# Patient Record
Sex: FEMALE | Race: WHITE | NOT HISPANIC OR LATINO | Employment: FULL TIME | URBAN - METROPOLITAN AREA
[De-identification: names, ages, dates, MRNs, and addresses within clinical notes are randomized per-mention and may not be internally consistent; named-entity substitution may affect disease eponyms.]

---

## 2021-06-14 ENCOUNTER — OFFICE VISIT (OUTPATIENT)
Dept: URGENT CARE | Facility: CLINIC | Age: 51
End: 2021-06-14
Payer: COMMERCIAL

## 2021-06-14 VITALS
HEART RATE: 62 BPM | TEMPERATURE: 98 F | HEIGHT: 63 IN | SYSTOLIC BLOOD PRESSURE: 135 MMHG | RESPIRATION RATE: 18 BRPM | BODY MASS INDEX: 34.02 KG/M2 | WEIGHT: 192 LBS | DIASTOLIC BLOOD PRESSURE: 90 MMHG | OXYGEN SATURATION: 98 %

## 2021-06-14 DIAGNOSIS — H92.02 ACUTE OTALGIA, LEFT: Primary | ICD-10-CM

## 2021-06-14 PROBLEM — Z79.899 CONTROLLED SUBSTANCE AGREEMENT SIGNED: Status: ACTIVE | Noted: 2019-04-22

## 2021-06-14 PROBLEM — I10 BENIGN ESSENTIAL HTN: Status: ACTIVE | Noted: 2019-02-04

## 2021-06-14 PROBLEM — I10 HYPERTENSION: Status: ACTIVE | Noted: 2020-07-01

## 2021-06-14 PROBLEM — F90.9 ADHD: Status: ACTIVE | Noted: 2019-02-18

## 2021-06-14 PROBLEM — G47.00 INSOMNIA: Status: ACTIVE | Noted: 2019-02-18

## 2021-06-14 PROCEDURE — 99203 OFFICE O/P NEW LOW 30 MIN: CPT | Mod: S$GLB,,, | Performed by: NURSE PRACTITIONER

## 2021-06-14 PROCEDURE — 99203 PR OFFICE/OUTPT VISIT, NEW, LEVL III, 30-44 MIN: ICD-10-PCS | Mod: S$GLB,,, | Performed by: NURSE PRACTITIONER

## 2021-06-14 RX ORDER — LORATADINE 10 MG/1
10 TABLET ORAL DAILY
Qty: 14 TABLET | Refills: 0 | Status: SHIPPED | OUTPATIENT
Start: 2021-06-14 | End: 2023-12-22

## 2021-06-14 RX ORDER — LISINOPRIL AND HYDROCHLOROTHIAZIDE 20; 25 MG/1; MG/1
TABLET ORAL
COMMUNITY
Start: 2021-06-09

## 2021-06-14 RX ORDER — METHYLPREDNISOLONE 4 MG/1
TABLET ORAL
Qty: 21 TABLET | Refills: 0 | Status: SHIPPED | OUTPATIENT
Start: 2021-06-14

## 2021-06-14 RX ORDER — DEXTROAMPHETAMINE SACCHARATE, AMPHETAMINE ASPARTATE, DEXTROAMPHETAMINE SULFATE AND AMPHETAMINE SULFATE 7.5; 7.5; 7.5; 7.5 MG/1; MG/1; MG/1; MG/1
1 TABLET ORAL DAILY
COMMUNITY
Start: 2021-06-09

## 2021-06-14 RX ORDER — PSEUDOEPHEDRINE HCL 30 MG
30 TABLET ORAL EVERY 4 HOURS PRN
Qty: 24 TABLET | Refills: 0 | Status: SHIPPED | OUTPATIENT
Start: 2021-06-14 | End: 2021-06-24

## 2023-06-29 ENCOUNTER — OFFICE VISIT (OUTPATIENT)
Dept: URGENT CARE | Facility: CLINIC | Age: 53
End: 2023-06-29
Payer: COMMERCIAL

## 2023-06-29 VITALS
HEART RATE: 86 BPM | BODY MASS INDEX: 34.01 KG/M2 | DIASTOLIC BLOOD PRESSURE: 105 MMHG | SYSTOLIC BLOOD PRESSURE: 141 MMHG | OXYGEN SATURATION: 98 % | TEMPERATURE: 98 F | WEIGHT: 192 LBS | RESPIRATION RATE: 16 BRPM

## 2023-06-29 DIAGNOSIS — T63.481A INSECT STINGS, ACCIDENTAL OR UNINTENTIONAL, INITIAL ENCOUNTER: Primary | ICD-10-CM

## 2023-06-29 PROCEDURE — 99203 PR OFFICE/OUTPT VISIT, NEW, LEVL III, 30-44 MIN: ICD-10-PCS | Mod: 25,S$GLB,, | Performed by: STUDENT IN AN ORGANIZED HEALTH CARE EDUCATION/TRAINING PROGRAM

## 2023-06-29 PROCEDURE — 96372 THER/PROPH/DIAG INJ SC/IM: CPT | Mod: S$GLB,,, | Performed by: STUDENT IN AN ORGANIZED HEALTH CARE EDUCATION/TRAINING PROGRAM

## 2023-06-29 PROCEDURE — 96372 PR INJECTION,THERAP/PROPH/DIAG2ST, IM OR SUBCUT: ICD-10-PCS | Mod: S$GLB,,, | Performed by: STUDENT IN AN ORGANIZED HEALTH CARE EDUCATION/TRAINING PROGRAM

## 2023-06-29 PROCEDURE — 99203 OFFICE O/P NEW LOW 30 MIN: CPT | Mod: 25,S$GLB,, | Performed by: STUDENT IN AN ORGANIZED HEALTH CARE EDUCATION/TRAINING PROGRAM

## 2023-06-29 RX ORDER — DEXAMETHASONE SODIUM PHOSPHATE 4 MG/ML
8 INJECTION, SOLUTION INTRA-ARTICULAR; INTRALESIONAL; INTRAMUSCULAR; INTRAVENOUS; SOFT TISSUE
Status: COMPLETED | OUTPATIENT
Start: 2023-06-29 | End: 2023-06-29

## 2023-06-29 RX ADMIN — DEXAMETHASONE SODIUM PHOSPHATE 8 MG: 4 INJECTION, SOLUTION INTRA-ARTICULAR; INTRALESIONAL; INTRAMUSCULAR; INTRAVENOUS; SOFT TISSUE at 01:06

## 2023-08-29 ENCOUNTER — TELEPHONE (OUTPATIENT)
Dept: ORTHOPEDICS | Facility: CLINIC | Age: 53
End: 2023-08-29

## 2023-08-29 NOTE — TELEPHONE ENCOUNTER
----- Message from Pam Quintana MA sent at 8/28/2023  3:28 PM CDT -----    ----- Message -----  From: Stella Shaffer  Sent: 8/28/2023   1:41 PM CDT  To: [facility] Staff    REFERRAL

## 2023-12-04 ENCOUNTER — OFFICE VISIT (OUTPATIENT)
Dept: URGENT CARE | Facility: CLINIC | Age: 53
End: 2023-12-04
Payer: COMMERCIAL

## 2023-12-04 VITALS
DIASTOLIC BLOOD PRESSURE: 86 MMHG | WEIGHT: 200 LBS | HEIGHT: 63 IN | SYSTOLIC BLOOD PRESSURE: 126 MMHG | BODY MASS INDEX: 35.44 KG/M2 | RESPIRATION RATE: 16 BRPM | TEMPERATURE: 99 F | HEART RATE: 92 BPM | OXYGEN SATURATION: 98 %

## 2023-12-04 DIAGNOSIS — J10.1 INFLUENZA B: Primary | ICD-10-CM

## 2023-12-04 DIAGNOSIS — J02.9 SORE THROAT: ICD-10-CM

## 2023-12-04 LAB
CTP QC/QA: YES
CTP QC/QA: YES
FLUAV AG NPH QL: NEGATIVE
FLUBV AG NPH QL: POSITIVE
S PYO RRNA THROAT QL PROBE: NEGATIVE

## 2023-12-04 PROCEDURE — 99214 PR OFFICE/OUTPT VISIT, EST, LEVL IV, 30-39 MIN: ICD-10-PCS | Mod: S$GLB,,, | Performed by: PHYSICIAN ASSISTANT

## 2023-12-04 PROCEDURE — 99214 OFFICE O/P EST MOD 30 MIN: CPT | Mod: S$GLB,,, | Performed by: PHYSICIAN ASSISTANT

## 2023-12-04 PROCEDURE — 87880 POCT RAPID STREP A: ICD-10-PCS | Mod: QW,,, | Performed by: PHYSICIAN ASSISTANT

## 2023-12-04 PROCEDURE — 87804 POCT INFLUENZA A/B: ICD-10-PCS | Mod: 59,QW,, | Performed by: PHYSICIAN ASSISTANT

## 2023-12-04 PROCEDURE — 87880 STREP A ASSAY W/OPTIC: CPT | Mod: QW,,, | Performed by: PHYSICIAN ASSISTANT

## 2023-12-04 PROCEDURE — 87804 INFLUENZA ASSAY W/OPTIC: CPT | Mod: 59,QW,, | Performed by: PHYSICIAN ASSISTANT

## 2023-12-04 RX ORDER — ALLOPURINOL 100 MG/1
TABLET ORAL
COMMUNITY
Start: 2023-11-10

## 2023-12-04 RX ORDER — OSELTAMIVIR PHOSPHATE 75 MG/1
75 CAPSULE ORAL 2 TIMES DAILY
Qty: 10 CAPSULE | Refills: 0 | Status: SHIPPED | OUTPATIENT
Start: 2023-12-04 | End: 2023-12-09

## 2023-12-04 NOTE — PATIENT INSTRUCTIONS
Take Tylenol or Motrin as needed.  You can take over-the-counter cough medications.  Tamiflu is not required to treat influenza and is recommended for those with chronic medical conditions or those under two.  I have sent it to the pharmacy for you in case she wants to take it.  Follow up with your primary care provider.  Return for any new or worsening symptoms.

## 2023-12-04 NOTE — PROGRESS NOTES
"Subjective:      Patient ID: María Wilson is a 53 y.o. female.    Vitals:  height is 5' 3" (1.6 m) and weight is 90.7 kg (200 lb). Her oral temperature is 98.8 °F (37.1 °C). Her blood pressure is 126/86 and her pulse is 92. Her respiration is 16 and oxygen saturation is 98%.     Chief Complaint: Sore Throat    Sore Throat   This is a new problem. The current episode started yesterday. The problem has been unchanged. There has been no fever. The pain is at a severity of 8/10. The pain is mild. Associated symptoms include congestion and coughing. She has had exposure to strep. She has tried nothing for the symptoms. The treatment provided no relief.     HENT:  Positive for congestion and sore throat.    Respiratory:  Positive for cough.     Objective:     Physical Exam    Assessment:     No diagnosis found.    Plan:       There are no diagnoses linked to this encounter.                "

## 2023-12-04 NOTE — PROGRESS NOTES
"Subjective:      Patient ID: María Wilson is a 53 y.o. female.    Vitals:  height is 5' 3" (1.6 m) and weight is 90.7 kg (200 lb). Her oral temperature is 98.8 °F (37.1 °C). Her blood pressure is 126/86 and her pulse is 92. Her respiration is 16 and oxygen saturation is 98%.     Chief Complaint: Sore Throat    Patient is a 53-year-old female who presents with sore throat.  She reports symptoms started yesterday.  She has associated cough and congestion.  She reports recent exposure to strep.  She is not taken any medications for her symptoms.  She denies fever.  She denies nausea, vomiting or diarrhea.              Constitution: Negative for chills and fever.   HENT:  Positive for congestion and sore throat. Negative for ear pain and ear discharge.    Respiratory:  Positive for cough.    Gastrointestinal:  Negative for abdominal pain, nausea, vomiting and diarrhea.      Objective:     Physical Exam   Constitutional: She appears well-developed.   HENT:   Head: Normocephalic and atraumatic.   Ears:   Right Ear: Tympanic membrane, external ear and ear canal normal.   Left Ear: Tympanic membrane, external ear and ear canal normal.   Nose: Nose normal.   Mouth/Throat: Uvula is midline and oropharynx is clear and moist. Mucous membranes are moist. No oropharyngeal exudate or posterior oropharyngeal erythema. Oropharynx is clear.   Eyes: Conjunctivae are normal.   Cardiovascular: Normal rate and normal heart sounds.   Pulmonary/Chest: Effort normal and breath sounds normal. No stridor. No respiratory distress. She has no decreased breath sounds.   Abdominal: Normal appearance.   Neurological: She is alert.   Nursing note and vitals reviewed.      Assessment:     1. Influenza B    2. Sore throat        Plan:       Influenza B  -     oseltamivir (TAMIFLU) 75 MG capsule; Take 1 capsule (75 mg total) by mouth 2 (two) times daily. for 5 days  Dispense: 10 capsule; Refill: 0    Sore throat  -     POCT Influenza A/B Rapid " Antigen  -     POCT rapid strep A          Medical Decision Making:   History:   Old Medical Records: I decided to obtain old medical records.  Differential Diagnosis:   Influenza   Strep   Viral URI  Urgent Care Management:  Urgent evaluation of a well appearing 53-year-old female who presents with sore throat, cough and congestion that started yesterday.  Vital signs are stable. Clear and equal breath sounds bilaterally. Oxygen saturation is stable. I doubt pneumonia. No sign of otitis media. Denied GI complaints.  No posterior oropharyngeal erythema.  Midline uvula.  Patient is noted to be influenza B positive.  Discussed risks and benefits of Tamiflu, patient would like the medication prescribed. Discussed results with patient. Return precautions given. Based on my clinical evaluation, I do not appreciate any immediate, emergent, or life threatening condition or etiology that warrants additional workup today and feel that the patient can be discharged with close follow up care.  Patient is to follow up with their primary care provider. All questions answered.

## 2023-12-22 ENCOUNTER — OFFICE VISIT (OUTPATIENT)
Dept: URGENT CARE | Facility: CLINIC | Age: 53
End: 2023-12-22
Payer: COMMERCIAL

## 2023-12-22 VITALS
WEIGHT: 200 LBS | SYSTOLIC BLOOD PRESSURE: 152 MMHG | RESPIRATION RATE: 18 BRPM | TEMPERATURE: 98 F | HEART RATE: 88 BPM | BODY MASS INDEX: 35.44 KG/M2 | HEIGHT: 63 IN | DIASTOLIC BLOOD PRESSURE: 73 MMHG | OXYGEN SATURATION: 99 %

## 2023-12-22 DIAGNOSIS — R09.81 CONGESTION OF NASAL SINUS: ICD-10-CM

## 2023-12-22 DIAGNOSIS — R05.1 ACUTE COUGH: ICD-10-CM

## 2023-12-22 DIAGNOSIS — J02.9 SORE THROAT: Primary | ICD-10-CM

## 2023-12-22 LAB
CTP QC/QA: YES
S PYO RRNA THROAT QL PROBE: NEGATIVE

## 2023-12-22 PROCEDURE — 87880 POCT RAPID STREP A: ICD-10-PCS | Mod: QW,,, | Performed by: NURSE PRACTITIONER

## 2023-12-22 PROCEDURE — 99213 OFFICE O/P EST LOW 20 MIN: CPT | Mod: S$GLB,,, | Performed by: NURSE PRACTITIONER

## 2023-12-22 PROCEDURE — 87880 STREP A ASSAY W/OPTIC: CPT | Mod: QW,,, | Performed by: NURSE PRACTITIONER

## 2023-12-22 PROCEDURE — 99213 PR OFFICE/OUTPT VISIT, EST, LEVL III, 20-29 MIN: ICD-10-PCS | Mod: S$GLB,,, | Performed by: NURSE PRACTITIONER

## 2023-12-22 RX ORDER — VALSARTAN AND HYDROCHLOROTHIAZIDE 320; 25 MG/1; MG/1
1 TABLET, FILM COATED ORAL
COMMUNITY
Start: 2023-12-21

## 2023-12-22 NOTE — PROGRESS NOTES
"Subjective:      Patient ID: María Wilson is a 53 y.o. female.    Vitals:  height is 5' 3" (1.6 m) and weight is 90.7 kg (200 lb). Her temperature is 98.2 °F (36.8 °C). Her blood pressure is 152/73 (abnormal). Her respiration is 18 and oxygen saturation is 99%.     Chief Complaint: Sore Throat    Sore Throat   This is a new problem. The current episode started yesterday. The problem has been gradually worsening. There has been no fever. Associated symptoms include congestion, coughing, swollen glands and trouble swallowing. Treatments tried: neftaly corcoran. The treatment provided mild relief.       HENT:  Positive for congestion, sore throat and trouble swallowing.    Respiratory:  Positive for cough.       Objective:     Physical Exam    Assessment:     1. Sore throat        Plan:       Sore throat  -     POCT rapid strep A                    "

## 2024-07-30 ENCOUNTER — OFFICE VISIT (OUTPATIENT)
Dept: URGENT CARE | Facility: CLINIC | Age: 54
End: 2024-07-30
Payer: COMMERCIAL

## 2024-07-30 VITALS
OXYGEN SATURATION: 98 % | TEMPERATURE: 98 F | SYSTOLIC BLOOD PRESSURE: 128 MMHG | WEIGHT: 203 LBS | HEART RATE: 67 BPM | DIASTOLIC BLOOD PRESSURE: 87 MMHG | RESPIRATION RATE: 16 BRPM | BODY MASS INDEX: 35.96 KG/M2

## 2024-07-30 DIAGNOSIS — M72.2 PLANTAR FASCIITIS OF RIGHT FOOT: Primary | ICD-10-CM

## 2024-07-30 PROCEDURE — 96372 THER/PROPH/DIAG INJ SC/IM: CPT | Mod: S$GLB,,, | Performed by: NURSE PRACTITIONER

## 2024-07-30 PROCEDURE — 99214 OFFICE O/P EST MOD 30 MIN: CPT | Mod: 25,S$GLB,, | Performed by: NURSE PRACTITIONER

## 2024-07-30 RX ORDER — METHYLPREDNISOLONE 4 MG/1
TABLET ORAL
Qty: 21 EACH | Refills: 0 | Status: SHIPPED | OUTPATIENT
Start: 2024-08-01 | End: 2024-08-22

## 2024-07-30 RX ORDER — DEXAMETHASONE SODIUM PHOSPHATE 4 MG/ML
8 INJECTION, SOLUTION INTRA-ARTICULAR; INTRALESIONAL; INTRAMUSCULAR; INTRAVENOUS; SOFT TISSUE
Status: COMPLETED | OUTPATIENT
Start: 2024-07-30 | End: 2024-07-30

## 2024-07-30 RX ADMIN — DEXAMETHASONE SODIUM PHOSPHATE 8 MG: 4 INJECTION, SOLUTION INTRA-ARTICULAR; INTRALESIONAL; INTRAMUSCULAR; INTRAVENOUS; SOFT TISSUE at 02:07

## 2024-07-30 NOTE — PROGRESS NOTES
Subjective:      Patient ID: María Wilson is a 54 y.o. female.    Vitals:  weight is 92.1 kg (203 lb). Her temperature is 98.2 °F (36.8 °C). Her blood pressure is 128/87 and her pulse is 67. Her respiration is 16 and oxygen saturation is 98%.     Chief Complaint: Ankle Pain    Patient presents to clinic with complaints of right heel pain.  Denies specific injury to area.  Reports walks often during job.    Ankle Pain   The incident occurred 6 to 12 hours ago. The injury mechanism is unknown. The pain is present in the right ankle. The pain is at a severity of 10/10. Pertinent negatives include no numbness.       Constitution: Negative for activity change, appetite change, chills, fatigue, fever, unexpected weight change and generalized weakness.   HENT:  Negative for ear pain, ear discharge, foreign body in ear, tinnitus, hearing loss, dental problem, mouth sores, tongue pain, facial swelling, congestion, postnasal drip, sinus pain, sinus pressure, sore throat, trouble swallowing and voice change.    Neck: Negative for neck pain, neck stiffness and painful lymph nodes.   Cardiovascular:  Negative for chest pain, leg swelling, palpitations and sob on exertion.   Eyes:  Negative for eye trauma, eye discharge, eye itching, eye pain, eye redness, vision loss and eyelid swelling.   Respiratory:  Negative for chest tightness, cough, sputum production, COPD, shortness of breath, wheezing and asthma.    Gastrointestinal:  Negative for abdominal pain, nausea, vomiting, constipation, diarrhea, bright red blood in stool and dark colored stools.   Endocrine: hair loss, cold intolerance and heat intolerance.   Genitourinary:  Negative for dysuria, frequency, urgency and hematuria.   Musculoskeletal:  Positive for pain and joint pain. Negative for trauma, joint swelling, abnormal ROM of joint and muscle ache.   Skin:  Negative for color change, pale, rash, wound and hives.   Allergic/Immunologic: Negative for environmental  allergies, seasonal allergies, food allergies, asthma, hives and itching.   Neurological:  Negative for dizziness, history of vertigo, light-headedness, facial drooping, speech difficulty, headaches, disorientation, altered mental status, loss of consciousness and numbness.   Hematologic/Lymphatic: Negative for swollen lymph nodes and easy bruising/bleeding. Does not bruise/bleed easily.   Psychiatric/Behavioral:  Negative for altered mental status, disorientation, confusion, agitation, sleep disturbance and hallucinations.       Objective:     Physical Exam   Constitutional: She is oriented to person, place, and time. She appears well-developed. She is cooperative.   HENT:   Head: Normocephalic and atraumatic.   Ears:   Right Ear: Hearing, tympanic membrane, external ear and ear canal normal.   Left Ear: Hearing, tympanic membrane, external ear and ear canal normal.   Nose: Nose normal. No mucosal edema or nasal deformity. No epistaxis. Right sinus exhibits no maxillary sinus tenderness and no frontal sinus tenderness. Left sinus exhibits no maxillary sinus tenderness and no frontal sinus tenderness.   Mouth/Throat: Uvula is midline, oropharynx is clear and moist and mucous membranes are normal. No trismus in the jaw. Normal dentition. No uvula swelling.   Eyes: Conjunctivae and lids are normal.   Neck: Trachea normal and phonation normal. Neck supple.   Cardiovascular: Normal rate, regular rhythm, normal heart sounds and normal pulses.   Pulmonary/Chest: Effort normal and breath sounds normal.   Abdominal: Normal appearance and bowel sounds are normal. Soft.   Musculoskeletal: Normal range of motion.         General: Normal range of motion.      Right foot: Tenderness present.      Comments: Tenderness to plantar surface of heel and medial right ankle   Neurological: She is alert and oriented to person, place, and time. She exhibits normal muscle tone.   Skin: Skin is warm, dry and intact.   Psychiatric: Her  speech is normal and behavior is normal. Judgment and thought content normal.   Nursing note and vitals reviewed.      Assessment:     1. Plantar fasciitis of right foot        Plan:       Plantar fasciitis of right foot  -     dexAMETHasone injection 8 mg administered intramuscularly in clinic  -     methylPREDNISolone (MEDROL DOSEPACK) 4 mg tablet; use as directed  Dispense: 21 each; Refill: 0    Patient was instructed to restrict activity, with goal to reduce swelling and pain. Mild injury to be treated at home with anti-inflammatory and/or pain medication and PRICE (Protect from further injury, Rest, Ice or heat for 15-20 min every 60-90 min, Compression with elastic bandage if appropriate, Elevation to prevent swelling.)  After swelling and pain are reduced, patient may begin rehabilitation exercises to prevent stiffness, improve ROM, and restore tissue/joint flexibility and strength.  Patient was encouraged to follow up if pain persists or if it is difficult to put weight on the injury after 2 weeks.    Return to clinic for new or worsening symptoms.  Patient is recommended to follow-up with their PCP post discharge.    Total time spent on med rec, H&P, with over half of the time in direct patient care: 31 minutes         Additional MDM:     Heart Failure Score:   COPD = No

## 2024-07-30 NOTE — PATIENT INSTRUCTIONS
Patient was instructed to restrict activity, with goal to reduce swelling and pain. Mild injury to be treated at home with anti-inflammatory and/or pain medication and PRICE (Protect from further injury, Rest, Ice or heat for 15-20 min every 60-90 min, Compression with elastic bandage if appropriate, Elevation to prevent swelling.)  After swelling and pain are reduced, patient may begin rehabilitation exercises to prevent stiffness, improve ROM, and restore tissue/joint flexibility and strength.  Patient was encouraged to follow up if pain persists or if it is difficult to put weight on the injury after 2 weeks.    Thank you for the opportunity to care for you today.  Please take all medications as directed, and continue any previously prescribed medications unless we specifically discussed discontinuing them.  If your symptoms do not resolve or worsen please return to the clinic for re-evaluation.  If your situation becomes emergent, please present to the nearest emergency department.  Follow-up with your PCP for continued evaluation and management.

## 2025-03-10 DIAGNOSIS — R92.333 HETEROGENEOUSLY DENSE TISSUE OF BOTH BREASTS ON MAMMOGRAPHY: Primary | ICD-10-CM

## 2025-03-10 DIAGNOSIS — Z91.89 INCREASED RISK OF BREAST CANCER: ICD-10-CM

## 2025-05-12 DIAGNOSIS — Z91.89 AT HIGH RISK FOR BREAST CANCER: Primary | ICD-10-CM

## 2025-05-13 ENCOUNTER — TELEPHONE (OUTPATIENT)
Facility: CLINIC | Age: 55
End: 2025-05-13
Payer: COMMERCIAL

## 2025-05-13 NOTE — TELEPHONE ENCOUNTER
Spoke with pt. She currently has [employer] insurance. We are OON. Spoke with yesy rodríguez np. She will send referral to [facility].

## 2025-07-10 DIAGNOSIS — R59.9 ENLARGED LYMPH NODE: Primary | ICD-10-CM

## 2025-07-15 ENCOUNTER — TELEPHONE (OUTPATIENT)
Dept: FAMILY MEDICINE | Facility: CLINIC | Age: 55
End: 2025-07-15
Payer: COMMERCIAL

## 2025-07-15 ENCOUNTER — OFFICE VISIT (OUTPATIENT)
Dept: OTOLARYNGOLOGY | Facility: CLINIC | Age: 55
End: 2025-07-15
Payer: COMMERCIAL

## 2025-07-15 VITALS — WEIGHT: 191 LBS | BODY MASS INDEX: 33.84 KG/M2 | HEIGHT: 63 IN

## 2025-07-15 DIAGNOSIS — R59.9 ENLARGED LYMPH NODE: ICD-10-CM

## 2025-07-15 PROCEDURE — 99999 PR PBB SHADOW E&M-EST. PATIENT-LVL III: CPT | Mod: PBBFAC,,, | Performed by: OTOLARYNGOLOGY

## 2025-07-15 NOTE — PROGRESS NOTES
"Subjective:       Patient ID: María Wilson is a 55 y.o. female.    Chief Complaint: Mass (Patient here to have her lymph nodes evaluated. States, " they swell quite often and I just keep getting told lets check them, check what?" )      This typically healthy 55-year-old tells me that for about a year she has been having multiple lymph nodes throughout her body she references her axilla her groin and her neck.  She has seen one of the ENT doctors at [facility] who evaluated this with a CT done in December of 2024 and then a follow up CT done just three weeks ago.  The CT shows multiple lymph nodes adjacent to the submandibular gland and while I can not see the films themselves the report is pasted below for convenience.      She tells me they are nontender but she was not particularly satisfied with the option of simply observing multiple lymph nodes and she references her aunt who recently had lymphoma.  This understandably brings her concern level up a notch.                [facility]    06/25/2025 9:22 AM CDT   CT neck with contrast    HISTORY: Thyroid nodule, and palpable area in the right submandibular region.    TECHNIQUE: Axial images obtained through the neck. 110 cc [id number]-[id number] IV. Reconstructed images displayed as well.    FINDINGS: Comparison made to CT neck 12/13/2024. Skull base demonstrates no evidence of acute process. The nasopharynx is unremarkable. The oropharynx demonstrates no focal evidence of abnormality.    Deviation of the nasal septum to the left noted.    Salivary glands demonstrate no evidence of acute process.    In the area of patient's palpable abnormality, there are submandibular lymph nodes noted. The larger of these estimated at 1.3 x 0.9 cm in diameter on the right, and 1.1 x 0.7 cm in diameter on the left. No evidence of mucosal mass demonstrated subtle  asymmetry in the region of the lingual tonsil anterior to the epiglottis does not appear " changed when compared to previous study. Minimal thyroid nodule on the right measuring 4 mm. A minimal 4 mm left lobe thyroid nodule better seen on this exam is  stable as well. The upper portion of the mediastinum demonstrates no evidence of acute process.    The upper lungs are unremarkable. Multilevel degenerative change cervical spine including prominent anterior osteophyte formation at the C6-C7 level.    IMPRESSION:  1. Submandibular lymph nodes corresponding to the patient's palpable abnormality, similar to slightly decreased in size when compared to previous exam of 12/13/2024.  2. No other evidence of interval change.    Finalized by Rob Cunningham MD 6/25/2025 9:22 AM           Objective:      ENT Physical Exam    So palpating her neck she has multiple lymph nodes palpable that do seem separate from the submandibular gland the node that is most clearly mobile an and a tethered an unrelated to the submandibular gland is in the left neck adjacent to the submandibular gland.  It may not be the largest but that is the easiest to localize and in that regard I have discussed with her one option which includes a needle biopsy of this node, choosing this node because that is easiest to isolate.      She is hoping we can proceed with that to get more clarity of possible and I have made it clear to her that a needle biopsy of the lymph node does not always provide a specific diagnosis but it isn't a next step to further identify what is going on.  I have also made it clear to her that that is often the case that a needle biopsy of the type we will begin with we will benefit from a 2nd needle biopsy sent for flow cytometry but that is best to do him in steps and see what the general cytology reveals 1st.    Procedure:  After discussing with the patient as above the left side of the neck was prepped out with alcohol sprayed with ethyl chloride.  A 22 gauge needle was used to make multiple passes through a small but  easily palpable lymph node in the left submandibular region.  The resulting needle biopsy was plated on glass slides and sprayed with fixative.  After plating the left node passes on two slides    I then access one of the right-sided nodes a little more anterior in the submandibular triangle and made multiple passes through that particular node and plated it on one slide.        Assessment:       1. Enlarged lymph node         Plan:          So I have sent fine-needle aspiration samples for cytology for one of the left lymph nodes was relatively easy to isolate from the submandibular gland and then also from one on the right.  We will discuss the findings when they are available and I mentioned to her that in some cases that is appropriate to repeat this for flow cytometry depending on how much clarity the pathologist is able to obtain from the needle biopsy samples that I have sent

## 2025-07-15 NOTE — TELEPHONE ENCOUNTER
Copied from CRM #[id number]. Topic: General Inquiry - Patient Advice  >> Jul 15, 2025  1:54 PM Patria wrote:  Type:  Sooner Apoointment Request    Caller is requesting a sooner appointment.  Caller declined first available appointment listed below.  Caller will not accept being placed on the waitlist and is requesting a message be sent to doctor.  Name of Caller:pt   When is the first available appointment?7/30/2025  Symptoms: n/a  Would the patient rather a call back or a response via [facility]? Call back   Best Call Back Number:[phone]    Additional Information:  Pt needs to reschedule appt please call back

## 2025-08-01 ENCOUNTER — OFFICE VISIT (OUTPATIENT)
Dept: FAMILY MEDICINE | Facility: CLINIC | Age: 55
End: 2025-08-01
Payer: COMMERCIAL

## 2025-08-01 VITALS
OXYGEN SATURATION: 98 % | BODY MASS INDEX: 33.82 KG/M2 | SYSTOLIC BLOOD PRESSURE: 124 MMHG | HEIGHT: 63 IN | RESPIRATION RATE: 18 BRPM | WEIGHT: 190.88 LBS | DIASTOLIC BLOOD PRESSURE: 88 MMHG | HEART RATE: 83 BPM

## 2025-08-01 DIAGNOSIS — M25.562 BILATERAL CHRONIC KNEE PAIN: ICD-10-CM

## 2025-08-01 DIAGNOSIS — F90.2 ATTENTION DEFICIT HYPERACTIVITY DISORDER (ADHD), COMBINED TYPE: ICD-10-CM

## 2025-08-01 DIAGNOSIS — G25.81 RESTLESS LEG: ICD-10-CM

## 2025-08-01 DIAGNOSIS — M10.9 GOUT, UNSPECIFIED CAUSE, UNSPECIFIED CHRONICITY, UNSPECIFIED SITE: ICD-10-CM

## 2025-08-01 DIAGNOSIS — G89.29 BILATERAL CHRONIC KNEE PAIN: ICD-10-CM

## 2025-08-01 DIAGNOSIS — M25.561 BILATERAL CHRONIC KNEE PAIN: ICD-10-CM

## 2025-08-01 DIAGNOSIS — I10 BENIGN ESSENTIAL HTN: Primary | ICD-10-CM

## 2025-08-01 DIAGNOSIS — M25.512 ACUTE PAIN OF LEFT SHOULDER: ICD-10-CM

## 2025-08-01 DIAGNOSIS — H40.89 OTHER SPECIFIED GLAUCOMA, UNSPECIFIED LATERALITY: ICD-10-CM

## 2025-08-01 LAB
AMP AMPHETAMINE 1000 NM/ML POC: NEGATIVE
BAR BARBITURATES 300 NG/ML POC: NEGATIVE
BUP BUPRENORPHINE 10 NG/ML POC: NEGATIVE
BZO BENZODIAZEPINES 300 NG/ML POC: NEGATIVE
COC COCAINE 300 NG/ML POC: NEGATIVE
CREATININE (CR) POC: 20
CTP QC/QA: YES
MET METHAMPHETAMINE 1000 NG/ML POC: NEGATIVE
MOP/OPI300 MORPHINE 300 NG/ML POC: NEGATIVE
MTD METHADONE 300 NG/ML POC: NEGATIVE
OXIDANT (OX) POC: NEGATIVE
OXY OXYCODONE 100 NG/ML POC: NEGATIVE
SPECIFIC GRAVITY (SG) POC: 1.01
TEMPERATURE (°F) POC: 92
THC MARIJUANA 50 NG/ML POC: ABNORMAL

## 2025-08-01 PROCEDURE — 3074F SYST BP LT 130 MM HG: CPT | Mod: CPTII,S$GLB,, | Performed by: FAMILY MEDICINE

## 2025-08-01 PROCEDURE — 3008F BODY MASS INDEX DOCD: CPT | Mod: CPTII,S$GLB,, | Performed by: FAMILY MEDICINE

## 2025-08-01 PROCEDURE — 1159F MED LIST DOCD IN RCRD: CPT | Mod: CPTII,S$GLB,, | Performed by: FAMILY MEDICINE

## 2025-08-01 PROCEDURE — 99999 PR PBB SHADOW E&M-EST. PATIENT-LVL IV: CPT | Mod: PBBFAC,,, | Performed by: FAMILY MEDICINE

## 2025-08-01 PROCEDURE — 80305 DRUG TEST PRSMV DIR OPT OBS: CPT | Mod: QW,S$GLB,, | Performed by: FAMILY MEDICINE

## 2025-08-01 PROCEDURE — 3079F DIAST BP 80-89 MM HG: CPT | Mod: CPTII,S$GLB,, | Performed by: FAMILY MEDICINE

## 2025-08-01 PROCEDURE — 1160F RVW MEDS BY RX/DR IN RCRD: CPT | Mod: CPTII,S$GLB,, | Performed by: FAMILY MEDICINE

## 2025-08-01 PROCEDURE — 99214 OFFICE O/P EST MOD 30 MIN: CPT | Mod: S$GLB,,, | Performed by: FAMILY MEDICINE

## 2025-08-01 RX ORDER — DEXTROAMPHETAMINE SACCHARATE, AMPHETAMINE ASPARTATE, DEXTROAMPHETAMINE SULFATE AND AMPHETAMINE SULFATE 7.5; 7.5; 7.5; 7.5 MG/1; MG/1; MG/1; MG/1
1 TABLET ORAL DAILY
Qty: 30 TABLET | Refills: 0 | Status: SHIPPED | OUTPATIENT
Start: 2025-08-01

## 2025-08-01 NOTE — PROGRESS NOTES
"  Subjective:       Patient ID: María Wilson is a 55 y.o. female.    Chief Complaint: Establish Care    History of Present Illness    CHIEF COMPLAINT:  Ms. Wilson presents today for follow up and medication management.    ADHD:  She continues Adderall 30 mg tablets, taking half dose daily during weekdays to maintain focus and complete work projects. She intentionally does not take medication on weekends, stating it is less critical to focus during non-work days. When medication is not taken, work performance is noticeably impaired with decreased productivity and ability to finish tasks. She denies side effects or concerns with current medication regimen.    MEDICAL MARIJUANA USE:  She uses medical marijuana for management of glaucoma and restless leg syndrome, primarily on weekends while avoiding use during work hours. Medical marijuana helps alleviate restless leg symptoms and improves her ability to sleep. She confirms regular follow-up with an eye doctor for glaucoma management.    SHOULDER/ARM PAIN:  She reports burning, stabbing pain localized near the shoulder blade, present for approximately 2 months. She describes the area as feeling constantly bruised when touched with associated numbness radiating down the entire arm during pain episodes. She denies any known injury or specific repetitive motion causing symptoms. The pain is characterized as a non-deep burning stab that is intermittent with no symptoms on the contralateral side. She notes the sensation is so intense that it feels like she can "almost feel her hair growing" in the affected area.    KNEE PROBLEMS:  She reports chronic bilateral knee pain, predominantly affecting the left knee with functional limitations where prolonged sitting impairs her mobility. Previous MRIs showed minimal findings. She has received cortisone injections which provided symptomatic relief for approximately 5 weeks. She currently manages knee discomfort by staying active " "and avoiding prolonged sitting. She is currently wearing insoles and is interested in obtaining additional orthopedic evaluation. She denies acute injury and is motivated to explore further treatment options to improve knee mobility and reduce discomfort.    NIGHT SWEATS:  She reports severe night sweats that are significantly disrupting her sleep. She describes waking up at 3:00 AM and needing to take a shower due to feeling like she was "swimming and sweating". She indicates that a previous healthcare provider declined to treat her night sweats due to concerns about cancer risk, specifically related to her history of breast cancer. She is seeking alternative management options for these symptoms.    DENTAL ISSUES:  She reports recent dental evaluation with concern for potential oral cancer. Dental provider ultimately determined the oral lesion is an allergic reaction to her partial denture. She expresses uncertainty about future dental treatment, stating she may need to be "toothless" temporarily. Diagnostic imaging revealed some areas of interest but no significant findings requiring further intervention.      ROS:  General: +night sweats  Musculoskeletal: +burning sensation, +shooting pain sensation, +difficulty walking, +pain with movement, +limb pain  Neurological: +numbness, +restless legs             Problem List[1]  María has a current medication list which includes the following prescription(s): allopurinol, valsartan-hydrochlorothiazide, and dextroamphetamine-amphetamine.        Health Maintenance Due   Topic Date Due    Hepatitis C Screening  Never done    HIV Screening  Never done    TETANUS VACCINE  Never done    Shingles Vaccine (1 of 2) Never done    Pneumococcal Vaccines (Age 50+) (1 of 1 - PCV) Never done      Health Maintenance reviewed and discussed.   Objective:      Vitals:    08/01/25 1330   BP: 124/88   Pulse: 83   Resp: 18   SpO2: 98%   Weight: 86.6 kg (190 lb 14.4 oz)   Height: 5' 3" (1.6 " m)     Body mass index is 33.82 kg/m².  Physical Exam  Vitals and nursing note reviewed.   Constitutional:       General: She is not in acute distress.     Appearance: She is not ill-appearing.   Cardiovascular:      Rate and Rhythm: Normal rate and regular rhythm.      Heart sounds: No murmur heard.  Pulmonary:      Effort: Pulmonary effort is normal.      Breath sounds: Normal breath sounds. No wheezing.   Skin:     General: Skin is warm and dry.      Findings: No rash.   Neurological:      Mental Status: She is alert.   Psychiatric:         Mood and Affect: Mood normal.         Behavior: Behavior normal.         Assessment:       Assessment & Plan    1. Evaluated ADHD management and current Adderall usage, continuing Adderall 30 mg, taking half tablet (15 mg) daily, typically around 10am.  2. Considered use of medical marijuana for glaucoma and restless leg syndrome.  3. Assessed shoulder blade pain, recommending conservative management with physical therapy before pursuing imaging studies.  4. Reviewed knee pain history, including previous MRIs and steroid injections.  5. Discussed hormone-related symptoms, noting previous provider's hesitation to treat due to cancer risk concerns.           Plan:       1. Benign essential HTN    2. Other specified glaucoma, unspecified laterality  Assessment & Plan:  Utilized medical marijuana for this. Follows with ophthalmology.       3. Restless leg  Assessment & Plan:  Manages with occasional medical marijuana      4. Attention deficit hyperactivity disorder (ADHD), combined type  Assessment & Plan:  Takes adderall 15mg twice daily.     Orders:  -     dextroamphetamine-amphetamine 30 mg Tab; Take 1 tablet (30 mg total) by mouth once daily.  Dispense: 30 tablet; Refill: 0  -     POCT Urine Drug Screen (With BUP)    5. Gout, unspecified cause, unspecified chronicity, unspecified site    6. Acute pain of left shoulder  -     Ambulatory Referral/Consult to Physical Therapy;  Future; Expected date: 08/08/2025    7. Bilateral chronic knee pain  -     Ambulatory referral/consult to Orthopedics; Future; Expected date: 08/08/2025         This note was generated with the assistance of ambient listening technology. Verbal consent was obtained by the patient and accompanying visitor(s) for the recording of patient appointment to facilitate this note. I attest to having reviewed and edited the generated note for accuracy, though some syntax or spelling errors may persist. Please contact the author of this note for any clarification.         [1]   Patient Active Problem List  Diagnosis    Benign essential HTN    BMI 32.0-32.9,adult    ADHD    Controlled substance agreement signed    Insomnia    Other specified glaucoma    Restless leg    Gout

## 2025-08-05 ENCOUNTER — OFFICE VISIT (OUTPATIENT)
Dept: OTOLARYNGOLOGY | Facility: CLINIC | Age: 55
End: 2025-08-05
Payer: COMMERCIAL

## 2025-08-05 VITALS — WEIGHT: 190.94 LBS | HEIGHT: 63 IN | BODY MASS INDEX: 33.83 KG/M2

## 2025-08-05 DIAGNOSIS — R59.9 ENLARGED LYMPH NODE: Primary | ICD-10-CM

## 2025-08-05 PROCEDURE — 99999 PR PBB SHADOW E&M-EST. PATIENT-LVL II: CPT | Mod: PBBFAC,,, | Performed by: OTOLARYNGOLOGY

## 2025-08-05 PROCEDURE — 88184 FLOWCYTOMETRY/ TC 1 MARKER: CPT | Performed by: OTOLARYNGOLOGY

## 2025-08-05 NOTE — PROGRESS NOTES
"Subjective:       Patient ID: María Wilson is a 55 y.o. female.    Chief Complaint: Follow-up (Patient is here for repeat FNA for flow cytometry. )      This patient returns for a repeat FNA and I just saw her and so I am just pacing the HPI below from our visit recently    "This typically healthy 55-year-old tells me that for about a year she has been having multiple lymph nodes throughout her body she references her axilla her groin and her neck.  She has seen one of the ENT doctors at [facility] [place] who evaluated this with a CT done in December of 2024 and then a follow up CT done just three weeks ago.  The CT shows multiple lymph nodes adjacent to the submandibular gland and while I can not see the films themselves the report is pasted below for convenience.       She tells me they are nontender but she was not particularly satisfied with the option of simply observing multiple lymph nodes and she references her aunt who recently had lymphoma.  This understandably brings her concern level up a notch."    The FNA that we did recently was benign but flow cytometry I think that is reasonable given the duration and her desire to be a sure as is possible that is something more worrisome is not going on.          Objective:      ENT Physical Exam    So the lymph nodes in question and the two that are most easily palpable or left and right submental almost mirror image in the anterior submental triangle, they maybe a little smaller but they are still palpable    With that in mind a 22 gauge needle was used to access the one on the left 1st after wiping with alcohol and spring with ethyl chloride and using the same syringe I access the one on the right in the interest of getting as much simple as possible with these somewhat difficult to palpate cervical lymph nodes.  At that point the syringe was rinsed out with RPMI solution placed and a small container with the RPMI media and sent to be process with flow " cytometry.        Assessment:       1. Enlarged lymph node         Plan:          I think the nodes are a little more difficult to palpate today which of course is a good sign as far as anything worrisome that is likely to be of concern come we did repeat the needle biopsy I passed through the left and the right submental nodes rinsed out the syringe with RPMI solution we will send that to flow cytometry     My advice at that point as of course to address any concerns that come up which may involve an open biopsy, but if that is benign or nondiagnostic I think we should just wait and see how things go

## 2025-08-08 LAB
LAB FLOW CYTOMETRY ANTIBODIES ANALYZED (OHS): NORMAL
LAB FLOW CYTOMETRY COMMENT (OHS): NORMAL
LAB FLOW CYTOMETRY INTERPRETATION (OHS): NORMAL
LABORATORY COMMENT REPORT: NORMAL